# Patient Record
Sex: FEMALE | Race: WHITE | Employment: FULL TIME | URBAN - METROPOLITAN AREA
[De-identification: names, ages, dates, MRNs, and addresses within clinical notes are randomized per-mention and may not be internally consistent; named-entity substitution may affect disease eponyms.]

---

## 2019-11-18 LAB
C. TRACHOMATIS, EXTERNAL RESULT: NEGATIVE
N. GONORRHOEAE, EXTERNAL RESULT: NEGATIVE

## 2019-12-11 LAB
ABO, EXTERNAL RESULT: NORMAL
HEP B, EXTERNAL RESULT: NEGATIVE
HIV, EXTERNAL RESULT: NONREACTIVE
RH FACTOR, EXTERNAL RESULT: NEGATIVE
RPR, EXTERNAL RESULT: NONREACTIVE
RUBELLA TITER, EXTERNAL RESULT: NORMAL

## 2020-04-24 ENCOUNTER — HOSPITAL ENCOUNTER (OUTPATIENT)
Dept: NURSING | Age: 30
Setting detail: INFUSION SERIES
Discharge: HOME | End: 2020-04-24
Payer: COMMERCIAL

## 2020-04-24 VITALS
HEIGHT: 62 IN | BODY MASS INDEX: 29.44 KG/M2 | SYSTOLIC BLOOD PRESSURE: 111 MMHG | HEART RATE: 95 BPM | RESPIRATION RATE: 20 BRPM | TEMPERATURE: 97.6 F | DIASTOLIC BLOOD PRESSURE: 69 MMHG | WEIGHT: 160 LBS

## 2020-04-24 LAB — RHIG LOT NUMBER: NORMAL

## 2020-04-24 PROCEDURE — 6360000002 HC RX W HCPCS: Performed by: OBSTETRICS & GYNECOLOGY

## 2020-04-24 PROCEDURE — 96372 THER/PROPH/DIAG INJ SC/IM: CPT

## 2020-04-24 RX ADMIN — HUMAN RHO(D) IMMUNE GLOBULIN 300 MCG: 300 INJECTION, SOLUTION INTRAMUSCULAR at 09:15

## 2020-04-24 ASSESSMENT — PAIN - FUNCTIONAL ASSESSMENT: PAIN_FUNCTIONAL_ASSESSMENT: 0-10

## 2020-04-24 NOTE — PROGRESS NOTES
Rhogam given. Tolerated without immediate complications. States she does not need any more info on medication. Discharged ambulatory.

## 2020-06-15 LAB — GBS, EXTERNAL RESULT: NEGATIVE

## 2020-06-30 ENCOUNTER — OFFICE VISIT (OUTPATIENT)
Dept: PRIMARY CARE CLINIC | Age: 30
End: 2020-06-30
Payer: COMMERCIAL

## 2020-06-30 PROCEDURE — 99211 OFF/OP EST MAY X REQ PHY/QHP: CPT | Performed by: NURSE PRACTITIONER

## 2020-06-30 NOTE — PROGRESS NOTES
Patient is having a delivery with Stephane Vail on 7/10/20 and was seen at clinic for prenatal covid test. . Patient instructed to self quarantine until she goes into labor or is instructed to go to hospital.

## 2020-07-02 LAB
SARS-COV-2: NOT DETECTED
SOURCE: NORMAL

## 2020-07-07 ENCOUNTER — OFFICE VISIT (OUTPATIENT)
Dept: PRIMARY CARE CLINIC | Age: 30
End: 2020-07-07
Payer: COMMERCIAL

## 2020-07-07 PROCEDURE — 99211 OFF/OP EST MAY X REQ PHY/QHP: CPT | Performed by: NURSE PRACTITIONER

## 2020-07-07 NOTE — PROGRESS NOTES
Thu Bone received a viral test for COVID-19. They were educated on isolation and quarantine as appropriate. For any symptoms, they were directed to seek care from their PCP, given contact information to establish with a doctor, directed to an urgent care or the emergency room.

## 2020-07-08 ENCOUNTER — ANESTHESIA (OUTPATIENT)
Dept: LABOR AND DELIVERY | Age: 30
End: 2020-07-08
Payer: COMMERCIAL

## 2020-07-08 ENCOUNTER — HOSPITAL ENCOUNTER (INPATIENT)
Age: 30
LOS: 2 days | Discharge: HOME | End: 2020-07-10
Attending: OBSTETRICS & GYNECOLOGY | Admitting: OBSTETRICS & GYNECOLOGY
Payer: COMMERCIAL

## 2020-07-08 ENCOUNTER — ANESTHESIA EVENT (OUTPATIENT)
Dept: LABOR AND DELIVERY | Age: 30
End: 2020-07-08
Payer: COMMERCIAL

## 2020-07-08 ENCOUNTER — APPOINTMENT (OUTPATIENT)
Dept: LABOR AND DELIVERY | Age: 30
End: 2020-07-08
Payer: COMMERCIAL

## 2020-07-08 PROBLEM — Z3A.39 39 WEEKS GESTATION OF PREGNANCY: Status: ACTIVE | Noted: 2020-07-08

## 2020-07-08 LAB
AMPHETAMINE SCREEN, URINE: NORMAL
BARBITURATE SCREEN URINE: NORMAL
BASOPHILS ABSOLUTE: 0 K/UL (ref 0–0.2)
BASOPHILS RELATIVE PERCENT: 0.5 %
BENZODIAZEPINE SCREEN, URINE: NORMAL
BUPRENORPHINE URINE: NORMAL
CANNABINOID SCREEN URINE: NORMAL
COCAINE METABOLITE SCREEN URINE: NORMAL
EOSINOPHILS ABSOLUTE: 0.1 K/UL (ref 0–0.6)
EOSINOPHILS RELATIVE PERCENT: 1.5 %
HCT VFR BLD CALC: 36.7 % (ref 36–48)
HEMOGLOBIN: 12.5 G/DL (ref 12–16)
LYMPHOCYTES ABSOLUTE: 1.6 K/UL (ref 1–5.1)
LYMPHOCYTES RELATIVE PERCENT: 17.1 %
Lab: NORMAL
MCH RBC QN AUTO: 29.4 PG (ref 26–34)
MCHC RBC AUTO-ENTMCNC: 34 G/DL (ref 31–36)
MCV RBC AUTO: 86.6 FL (ref 80–100)
METHADONE SCREEN, URINE: NORMAL
MONOCYTES ABSOLUTE: 0.6 K/UL (ref 0–1.3)
MONOCYTES RELATIVE PERCENT: 6.9 %
NEUTROPHILS ABSOLUTE: 6.9 K/UL (ref 1.7–7.7)
NEUTROPHILS RELATIVE PERCENT: 74 %
OPIATE SCREEN URINE: NORMAL
OXYCODONE URINE: NORMAL
PDW BLD-RTO: 15.2 % (ref 12.4–15.4)
PH UA: 7
PHENCYCLIDINE SCREEN URINE: NORMAL
PLATELET # BLD: 220 K/UL (ref 135–450)
PMV BLD AUTO: 9.1 FL (ref 5–10.5)
PROPOXYPHENE SCREEN: NORMAL
RBC # BLD: 4.24 M/UL (ref 4–5.2)
SARS-COV-2, PCR: NOT DETECTED
SPECIMEN STATUS: NORMAL
TOTAL SYPHILLIS IGG/IGM: NORMAL
WBC # BLD: 9.3 K/UL (ref 4–11)

## 2020-07-08 PROCEDURE — 85025 COMPLETE CBC W/AUTO DIFF WBC: CPT

## 2020-07-08 PROCEDURE — 51701 INSERT BLADDER CATHETER: CPT

## 2020-07-08 PROCEDURE — 10907ZC DRAINAGE OF AMNIOTIC FLUID, THERAPEUTIC FROM PRODUCTS OF CONCEPTION, VIA NATURAL OR ARTIFICIAL OPENING: ICD-10-PCS | Performed by: OBSTETRICS & GYNECOLOGY

## 2020-07-08 PROCEDURE — 2500000003 HC RX 250 WO HCPCS: Performed by: NURSE ANESTHETIST, CERTIFIED REGISTERED

## 2020-07-08 PROCEDURE — 3700000025 EPIDURAL BLOCK: Performed by: ANESTHESIOLOGY

## 2020-07-08 PROCEDURE — 2580000003 HC RX 258: Performed by: OBSTETRICS & GYNECOLOGY

## 2020-07-08 PROCEDURE — 6360000002 HC RX W HCPCS: Performed by: OBSTETRICS & GYNECOLOGY

## 2020-07-08 PROCEDURE — 7200000001 HC VAGINAL DELIVERY

## 2020-07-08 PROCEDURE — 80307 DRUG TEST PRSMV CHEM ANLYZR: CPT

## 2020-07-08 PROCEDURE — 1220000000 HC SEMI PRIVATE OB R&B

## 2020-07-08 PROCEDURE — 6370000000 HC RX 637 (ALT 250 FOR IP): Performed by: OBSTETRICS & GYNECOLOGY

## 2020-07-08 PROCEDURE — 3E033VJ INTRODUCTION OF OTHER HORMONE INTO PERIPHERAL VEIN, PERCUTANEOUS APPROACH: ICD-10-PCS | Performed by: OBSTETRICS & GYNECOLOGY

## 2020-07-08 PROCEDURE — 86780 TREPONEMA PALLIDUM: CPT

## 2020-07-08 PROCEDURE — 0KQM0ZZ REPAIR PERINEUM MUSCLE, OPEN APPROACH: ICD-10-PCS | Performed by: OBSTETRICS & GYNECOLOGY

## 2020-07-08 RX ORDER — DIPHENHYDRAMINE HYDROCHLORIDE 50 MG/ML
25 INJECTION INTRAMUSCULAR; INTRAVENOUS EVERY 4 HOURS PRN
Status: DISCONTINUED | OUTPATIENT
Start: 2020-07-08 | End: 2020-07-08

## 2020-07-08 RX ORDER — ONDANSETRON 2 MG/ML
4 INJECTION INTRAMUSCULAR; INTRAVENOUS EVERY 6 HOURS PRN
Status: DISCONTINUED | OUTPATIENT
Start: 2020-07-08 | End: 2020-07-10 | Stop reason: HOSPADM

## 2020-07-08 RX ORDER — DIPHENHYDRAMINE HYDROCHLORIDE 50 MG/ML
12.5 INJECTION INTRAMUSCULAR; INTRAVENOUS EVERY 6 HOURS PRN
Status: DISCONTINUED | OUTPATIENT
Start: 2020-07-08 | End: 2020-07-10 | Stop reason: HOSPADM

## 2020-07-08 RX ORDER — LIDOCAINE HYDROCHLORIDE 10 MG/ML
30 INJECTION, SOLUTION EPIDURAL; INFILTRATION; INTRACAUDAL; PERINEURAL PRN
Status: DISCONTINUED | OUTPATIENT
Start: 2020-07-08 | End: 2020-07-08

## 2020-07-08 RX ORDER — FERROUS SULFATE 325(65) MG
325 TABLET ORAL 2 TIMES DAILY WITH MEALS
Status: DISCONTINUED | OUTPATIENT
Start: 2020-07-09 | End: 2020-07-10 | Stop reason: HOSPADM

## 2020-07-08 RX ORDER — LANOLIN 100 %
OINTMENT (GRAM) TOPICAL PRN
Status: DISCONTINUED | OUTPATIENT
Start: 2020-07-08 | End: 2020-07-10 | Stop reason: HOSPADM

## 2020-07-08 RX ORDER — ACETAMINOPHEN 325 MG/1
650 TABLET ORAL EVERY 4 HOURS PRN
Status: DISCONTINUED | OUTPATIENT
Start: 2020-07-08 | End: 2020-07-08

## 2020-07-08 RX ORDER — SODIUM CHLORIDE 0.9 % (FLUSH) 0.9 %
10 SYRINGE (ML) INJECTION PRN
Status: DISCONTINUED | OUTPATIENT
Start: 2020-07-08 | End: 2020-07-10 | Stop reason: HOSPADM

## 2020-07-08 RX ORDER — SODIUM CHLORIDE 0.9 % (FLUSH) 0.9 %
10 SYRINGE (ML) INJECTION PRN
Status: DISCONTINUED | OUTPATIENT
Start: 2020-07-08 | End: 2020-07-08

## 2020-07-08 RX ORDER — SODIUM CHLORIDE 0.9 % (FLUSH) 0.9 %
10 SYRINGE (ML) INJECTION EVERY 12 HOURS SCHEDULED
Status: DISCONTINUED | OUTPATIENT
Start: 2020-07-08 | End: 2020-07-08

## 2020-07-08 RX ORDER — ONDANSETRON 2 MG/ML
4 INJECTION INTRAMUSCULAR; INTRAVENOUS EVERY 6 HOURS PRN
Status: DISCONTINUED | OUTPATIENT
Start: 2020-07-08 | End: 2020-07-08

## 2020-07-08 RX ORDER — SODIUM CHLORIDE, SODIUM LACTATE, POTASSIUM CHLORIDE, CALCIUM CHLORIDE 600; 310; 30; 20 MG/100ML; MG/100ML; MG/100ML; MG/100ML
INJECTION, SOLUTION INTRAVENOUS CONTINUOUS
Status: DISCONTINUED | OUTPATIENT
Start: 2020-07-08 | End: 2020-07-10 | Stop reason: HOSPADM

## 2020-07-08 RX ORDER — SIMETHICONE 80 MG
80 TABLET,CHEWABLE ORAL EVERY 6 HOURS PRN
Status: DISCONTINUED | OUTPATIENT
Start: 2020-07-08 | End: 2020-07-10 | Stop reason: HOSPADM

## 2020-07-08 RX ORDER — BUPIVACAINE HYDROCHLORIDE 5 MG/ML
INJECTION, SOLUTION EPIDURAL; INTRACAUDAL PRN
Status: DISCONTINUED | OUTPATIENT
Start: 2020-07-08 | End: 2020-07-08 | Stop reason: SDUPTHER

## 2020-07-08 RX ORDER — IBUPROFEN 800 MG/1
800 TABLET ORAL EVERY 6 HOURS PRN
Status: DISCONTINUED | OUTPATIENT
Start: 2020-07-09 | End: 2020-07-10 | Stop reason: HOSPADM

## 2020-07-08 RX ORDER — KETOROLAC TROMETHAMINE 30 MG/ML
30 INJECTION, SOLUTION INTRAMUSCULAR; INTRAVENOUS EVERY 6 HOURS
Status: DISPENSED | OUTPATIENT
Start: 2020-07-08 | End: 2020-07-09

## 2020-07-08 RX ORDER — ACETAMINOPHEN 325 MG/1
650 TABLET ORAL EVERY 4 HOURS PRN
Status: DISCONTINUED | OUTPATIENT
Start: 2020-07-08 | End: 2020-07-10 | Stop reason: HOSPADM

## 2020-07-08 RX ORDER — DOCUSATE SODIUM 100 MG/1
100 CAPSULE, LIQUID FILLED ORAL 2 TIMES DAILY PRN
Status: DISCONTINUED | OUTPATIENT
Start: 2020-07-08 | End: 2020-07-10 | Stop reason: HOSPADM

## 2020-07-08 RX ORDER — BUPIVACAINE HYDROCHLORIDE 2.5 MG/ML
INJECTION, SOLUTION EPIDURAL; INFILTRATION; INTRACAUDAL PRN
Status: DISCONTINUED | OUTPATIENT
Start: 2020-07-08 | End: 2020-07-08 | Stop reason: SDUPTHER

## 2020-07-08 RX ORDER — SODIUM CHLORIDE, SODIUM LACTATE, POTASSIUM CHLORIDE, CALCIUM CHLORIDE 600; 310; 30; 20 MG/100ML; MG/100ML; MG/100ML; MG/100ML
INJECTION, SOLUTION INTRAVENOUS CONTINUOUS
Status: DISCONTINUED | OUTPATIENT
Start: 2020-07-08 | End: 2020-07-08

## 2020-07-08 RX ORDER — SODIUM CHLORIDE 0.9 % (FLUSH) 0.9 %
10 SYRINGE (ML) INJECTION EVERY 12 HOURS SCHEDULED
Status: DISCONTINUED | OUTPATIENT
Start: 2020-07-08 | End: 2020-07-10 | Stop reason: HOSPADM

## 2020-07-08 RX ORDER — BUTORPHANOL TARTRATE 1 MG/ML
1 INJECTION, SOLUTION INTRAMUSCULAR; INTRAVENOUS
Status: DISCONTINUED | OUTPATIENT
Start: 2020-07-08 | End: 2020-07-08

## 2020-07-08 RX ADMIN — BENZOCAINE AND LEVOMENTHOL: 200; 5 SPRAY TOPICAL at 23:50

## 2020-07-08 RX ADMIN — ACETAMINOPHEN 650 MG: 325 TABLET ORAL at 23:49

## 2020-07-08 RX ADMIN — BUPIVACAINE HYDROCHLORIDE 5 ML: 2.5 INJECTION, SOLUTION EPIDURAL; INFILTRATION; INTRACAUDAL; PERINEURAL at 16:26

## 2020-07-08 RX ADMIN — BUPIVACAINE HYDROCHLORIDE 10 ML: 5 INJECTION, SOLUTION EPIDURAL; INTRACAUDAL; PERINEURAL at 21:30

## 2020-07-08 RX ADMIN — DOCUSATE SODIUM 100 MG: 100 CAPSULE, LIQUID FILLED ORAL at 23:49

## 2020-07-08 RX ADMIN — KETOROLAC TROMETHAMINE 30 MG: 30 INJECTION, SOLUTION INTRAMUSCULAR at 23:49

## 2020-07-08 RX ADMIN — Medication 1 MILLI-UNITS/MIN: at 08:53

## 2020-07-08 RX ADMIN — WITCH HAZEL 40 EACH: 500 SOLUTION RECTAL; TOPICAL at 23:50

## 2020-07-08 RX ADMIN — Medication 10 ML: at 23:50

## 2020-07-08 RX ADMIN — Medication 15 ML/HR: at 16:32

## 2020-07-08 RX ADMIN — SODIUM CHLORIDE, POTASSIUM CHLORIDE, SODIUM LACTATE AND CALCIUM CHLORIDE: 600; 310; 30; 20 INJECTION, SOLUTION INTRAVENOUS at 15:59

## 2020-07-08 RX ADMIN — SODIUM CHLORIDE, POTASSIUM CHLORIDE, SODIUM LACTATE AND CALCIUM CHLORIDE: 600; 310; 30; 20 INJECTION, SOLUTION INTRAVENOUS at 08:52

## 2020-07-08 RX ADMIN — SODIUM CHLORIDE, POTASSIUM CHLORIDE, SODIUM LACTATE AND CALCIUM CHLORIDE: 600; 310; 30; 20 INJECTION, SOLUTION INTRAVENOUS at 12:50

## 2020-07-08 ASSESSMENT — PAIN SCALES - GENERAL: PAINLEVEL_OUTOF10: 5

## 2020-07-08 NOTE — ANESTHESIA PROCEDURE NOTES
Epidural Block    Patient location during procedure: OB  Start time: 7/8/2020 4:14 PM  End time: 7/8/2020 4:32 PM  Reason for block: labor epidural  Staffing  Resident/CRNA: GONZALES Moreno CRNA  Performed: resident/CRNA   Preanesthetic Checklist  Completed: patient identified, site marked, surgical consent, pre-op evaluation, timeout performed, IV checked, risks and benefits discussed, monitors and equipment checked, anesthesia consent given, oxygen available and patient being monitored  Epidural  Patient position: sitting  Prep: Betadine  Patient monitoring: continuous pulse ox and frequent blood pressure checks  Approach: midline  Location: lumbar (1-5) (L4-5)  Injection technique: DAVE saline  Provider prep: mask and sterile gloves  Needle  Needle type: Tuohy   Needle gauge: 17 G  Needle length: 3.5 in  Catheter type: side hole  Catheter size: 19 G  Catheter at skin depth: 10 cm  Test dose: negative  Assessment  Sensory level: T6  Hemodynamics: stable  Attempts: 1  Additional Notes  Sitting, Sterile prep/drape, 1%Xylo at L4-5, 17ga Tuohy with DAVE, 25ga Pencan for w/+CSF for DPE, Pencan removed, Catheter inserted, negative test dose, sterile dressing applied.

## 2020-07-08 NOTE — FLOWSHEET NOTE
Dr Easton Carmichael updated on [address] [facility] with occasional late decel resolved with position change and IVF bolus.

## 2020-07-08 NOTE — H&P
Department of Obstetrics and Gynecology   Obstetrics History and Physical        CHIEF COMPLAINT:  IOL    HISTORY OF PRESENT ILLNESS:      The patient is a 27 y.o. female at 38w11d. OB History        3    Para   1    Term   0       0    AB   0    Living   1       SAB   0    TAB   0    Ectopic   0    Molar        Multiple   0    Live Births   1            Patient presents with a chief complaint as above and is being admitted for induction    Estimated Due Date: Estimated Date of Delivery: 7/10/20    PRENATAL CARE:    Complicated by: none    PAST OB HISTORY:  OB History        3    Para   1    Term   0       0    AB   0    Living   1       SAB   0    TAB   0    Ectopic   0    Molar        Multiple   0    Live Births   1                Past Medical History:        Diagnosis Date    Abnormal Pap smear of cervix     Asthma     as a child, no inhaler    HPV (human papilloma virus) anogenital infection      Past Surgical History:        Procedure Laterality Date    WISDOM TOOTH EXTRACTION       Allergies:  Chantix [varenicline tartrate]    Social History:    Social History     Socioeconomic History    Marital status:      Spouse name: Not on file    Number of children: Not on file    Years of education: Not on file    Highest education level: Not on file   Occupational History    Not on file   Social Needs    Financial resource strain: Not on file    Food insecurity     Worry: Not on file     Inability: Not on file    Transportation needs     Medical: Not on file     Non-medical: Not on file   Tobacco Use    Smoking status: Former Smoker     Packs/day: 0.50    Smokeless tobacco: Never Used   Substance and Sexual Activity    Alcohol use:  Yes     Alcohol/week: 8.0 standard drinks     Types: 8 Cans of beer per week    Drug use: No    Sexual activity: Yes     Partners: Male   Lifestyle    Physical activity     Days per week: Not on file     Minutes per session: Not on file  Stress: Not on file   Relationships    Social connections     Talks on phone: Not on file     Gets together: Not on file     Attends Mandaen service: Not on file     Active member of club or organization: Not on file     Attends meetings of clubs or organizations: Not on file     Relationship status: Not on file    Intimate partner violence     Fear of current or ex partner: Not on file     Emotionally abused: Not on file     Physically abused: Not on file     Forced sexual activity: Not on file   Other Topics Concern    Not on file   Social History Narrative    Not on file     Family History:   History reviewed. No pertinent family history. Medications Prior to Admission:  Medications Prior to Admission: ferrous sulfate 325 (65 FE) MG tablet, Take 1 tablet by mouth daily (with breakfast)  docusate sodium (COLACE, DULCOLAX) 100 MG CAPS, Take 100 mg by mouth 2 times daily  [DISCONTINUED] ibuprofen (ADVIL;MOTRIN) 800 MG tablet, Take 1 tablet by mouth every 6 hours as needed  [DISCONTINUED] acetaminophen-codeine (TYLENOL #3) 300-30 MG per tablet, Take 1-2 tablets by mouth every 6 hours as needed for Pain  Prenatal Vit-Fe Fumarate-FA (PRENATAL VITAMIN PO), Take 1 tablet by mouth    REVIEW OF SYSTEMS:    wnl    PHYSICAL EXAM:  Vitals:    07/08/20 0814 07/08/20 0852   BP: 130/72 128/70   Pulse: 99 90   Resp: 16 16   Temp: 97.6 °F (36.4 °C)    TempSrc: Oral    Weight: 169 lb (76.7 kg)    Height: 5' 2\" (1.575 m)      General appearance:  awake, alert, cooperative, no apparent distress, and appears stated age  Neurologic:  Awake, alert, oriented to name, place and time. Lungs:  No increased work of breathing, good air exchange  Abdomen:  Soft, non tender, gravid, consistent with her gestational age, EFW by Leopold's maneuver was 8#  Fetal heart rate:  Reassuring.   Pelvis:  Adequate pelvis  Cervix: 2/50%/-2  Contraction frequency:  none    Membranes:  Intact    Labs:   CBC with Differential:    Lab Results Component Value Date    WBC 9.3 07/08/2020    RBC 4.24 07/08/2020    HGB 12.5 07/08/2020    HCT 36.7 07/08/2020     07/08/2020    MCV 86.6 07/08/2020    MCH 29.4 07/08/2020    MCHC 34.0 07/08/2020    RDW 15.2 07/08/2020    LYMPHOPCT 17.1 07/08/2020    MONOPCT 6.9 07/08/2020    BASOPCT 0.5 07/08/2020    MONOSABS 0.6 07/08/2020    LYMPHSABS 1.6 07/08/2020    EOSABS 0.1 07/08/2020    BASOSABS 0.0 07/08/2020       ASSESSMENT AND PLAN:    Admit/IOL-Pitocin, anticipate normal delivery, routine labor orders  Fetus: Reassuring  GBS: No    RAMIRO Womack

## 2020-07-08 NOTE — ANESTHESIA PRE PROCEDURE
Department of Anesthesiology  Preprocedure Note       Name:  Sabine Danielle   Age:  27 y.o.  :  1990                                          MRN:  [id number]         Date:  2020      Surgeon: * No surgeons listed *    Procedure: * No procedures listed *    Medications prior to admission:   Prior to Admission medications    Medication Sig Start Date End Date Taking?  Authorizing Provider   ferrous sulfate 325 (65 FE) MG tablet Take 1 tablet by mouth daily (with breakfast) 16   Snow Beasley MD   docusate sodium (COLACE, DULCOLAX) 100 MG CAPS Take 100 mg by mouth 2 times daily 16   Snow Beasley MD   Prenatal Vit-Fe Fumarate-FA (PRENATAL VITAMIN PO) Take 1 tablet by mouth    Historical Provider, MD       Current medications:    Current Facility-Administered Medications   Medication Dose Route Frequency Provider Last Rate Last Dose    lactated ringers infusion   Intravenous Continuous Cheng Laureano  mL/hr at 20 1559      sodium chloride flush 0.9 % injection 10 mL  10 mL Intravenous 2 times per day Cheng Laureano MD        sodium chloride flush 0.9 % injection 10 mL  10 mL Intravenous PRN Cheng Laureano MD        lidocaine PF 1 % injection 30 mL  30 mL Other PRN Cheng Laureano MD        acetaminophen (TYLENOL) tablet 650 mg  650 mg Oral Q4H PRN Cheng Laureano MD        diphenhydrAMINE (BENADRYL) injection 25 mg  25 mg Intravenous Q4H PRN Cheng Laureano MD        ondansetron [facility]) injection 4 mg  4 mg Intravenous Q6H PRN Cheng Laureano MD        oxytocin (PITOCIN) 30 units in 500 mL infusion  1 bang-units/min Intravenous Continuous PRN Cheng Laureano MD        oxytocin (PITOCIN) 30 units in 500 mL infusion  1 bang-units/min Intravenous Continuous Cheng Laureano MD 10 mL/hr at 20 1530 10 bang-units/min at 20 1530    penicillin G potassium 5 Million Units in dextrose 5 % 100 mL IVPB (mini-bag)  5 Million Units Intravenous Once Shanna Bennett MD        Followed by   Lorna Mendez penicillin G potassium in d5w IVPB 2.5 Million Units  2.5 Million Units Intravenous Q4H Shanna Bennett MD        butorphanol (STADOL) injection 1 mg  1 mg Intravenous Q3H PRN Shanna Bennett MD         Facility-Administered Medications Ordered in Other Encounters   Medication Dose Route Frequency Provider Last Rate Last Dose    bupivacaine (PF) (MARCAINE) 0.25 % injection    PRN Waldoell Delfabian, APRN - CRNA   5 mL at 07/08/20 1626    sodium chloride 0.9 % 200 mL with fentaNYL 500 mcg, bupivacaine 0.5% 50 mL (OB) epidural    Continuous PRN Runell Delude, APRN - CRNA 15 mL/hr at 07/08/20 1632 15 mL/hr at 07/08/20 1632       Allergies: Allergies   Allergen Reactions    Chantix [Varenicline Tartrate] Rash       Problem List:    Patient Active Problem List   Diagnosis Code    38 weeks gestation of pregnancy Z3A.38    Delayed delivery after SROM (spontaneous rupture of membranes) O42.90    39 weeks gestation of pregnancy Z3A.39       Past Medical History:        Diagnosis Date    Abnormal Pap smear of cervix     Asthma     as a child, no inhaler    Chlamydia 2010    HPV (human papilloma virus) anogenital infection        Past Surgical History:        Procedure Laterality Date    COLPOSCOPY  2013    WISDOM TOOTH EXTRACTION         Social History:    Social History     Tobacco Use    Smoking status: Former Smoker     Packs/day: 0.50    Smokeless tobacco: Never Used   Substance Use Topics    Alcohol use:  Yes     Alcohol/week: 8.0 standard drinks     Types: 8 Cans of beer per week                                Counseling given: Not Answered      Vital Signs (Current):   Vitals:    07/08/20 1359 07/08/20 1456 07/08/20 1559 07/08/20 1623   BP: (!) 111/58 123/76 134/78 131/82   Pulse: 71 77 101 93   Resp: 16 16 18 16   Temp:  36.6 °C (97.9 °F)     TempSrc:  Oral     Weight:       Height: BP Readings from Last 3 Encounters:   07/08/20 131/82   04/24/20 111/69   07/04/16 132/80       NPO Status: Time of last liquid consumption: 0700                        Time of last solid consumption: 0700                        Date of last liquid consumption: 07/08/20                        Date of last solid food consumption: 07/08/20    BMI:   Wt Readings from Last 3 Encounters:   07/08/20 169 lb (76.7 kg)   04/24/20 160 lb (72.6 kg)   06/30/16 172 lb (78 kg)     Body mass index is 30.91 kg/m². CBC:   Lab Results   Component Value Date    WBC 9.3 07/08/2020    RBC 4.24 07/08/2020    HGB 12.5 07/08/2020    HCT 36.7 07/08/2020    MCV 86.6 07/08/2020    RDW 15.2 07/08/2020     07/08/2020       CMP:   Lab Results   Component Value Date     10/26/2015    K 3.3 10/26/2015    CL 99 10/26/2015    CO2 25 10/26/2015    BUN 7 10/26/2015    CREATININE <0.5 10/26/2015    GFRAA >60 10/26/2015    AGRATIO 1.6 10/26/2015    LABGLOM >60 10/26/2015    GLUCOSE 106 10/26/2015    PROT 7.6 10/26/2015    CALCIUM 9.2 10/26/2015    BILITOT 0.3 10/26/2015    ALKPHOS 52 10/26/2015    AST 22 10/26/2015    ALT 26 10/26/2015       POC Tests: No results for input(s): POCGLU, POCNA, POCK, POCCL, POCBUN, POCHEMO, POCHCT in the last 72 hours.     Coags: No results found for: PROTIME, INR, APTT    HCG (If Applicable):   Lab Results   Component Value Date    PREGTESTUR Negative 10/26/2015        ABGs: No results found for: PHART, PO2ART, WOV0JHK, VQO2YGQ, BEART, R8KWLVPF     Type & Screen (If Applicable):  No results found for: LABABO, LABRH    Drug/Infectious Status (If Applicable):  No results found for: HIV, HEPCAB    COVID-19 Screening (If Applicable):   Lab Results   Component Value Date    COVID19 Not Detected 06/30/2020         Anesthesia Evaluation   no history of anesthetic complications:   Airway: Mallampati: II  TM distance: >3 FB   Neck ROM: full  Mouth opening: > = 3 FB Dental: normal exam Pulmonary:normal exam    (+) asthma:                            Cardiovascular:Negative CV ROS                      Neuro/Psych:   Negative Neuro/Psych ROS              GI/Hepatic/Renal: Neg GI/Hepatic/Renal ROS            Endo/Other: Negative Endo/Other ROS                    Abdominal:           Vascular: negative vascular ROS. Anesthesia Plan      epidural     ASA 2 - emergent             Anesthetic plan and risks discussed with patient and spouse. Plan discussed with attending.                   GONZALES Fernandez - CRNA   7/8/2020

## 2020-07-09 LAB
HCT VFR BLD CALC: 35 % (ref 36–48)
HEMOGLOBIN: 11.6 G/DL (ref 12–16)
MCH RBC QN AUTO: 29.1 PG (ref 26–34)
MCHC RBC AUTO-ENTMCNC: 33.1 G/DL (ref 31–36)
MCV RBC AUTO: 87.8 FL (ref 80–100)
PDW BLD-RTO: 15.2 % (ref 12.4–15.4)
PLATELET # BLD: 202 K/UL (ref 135–450)
PMV BLD AUTO: 9.1 FL (ref 5–10.5)
RBC # BLD: 3.98 M/UL (ref 4–5.2)
WBC # BLD: 17.2 K/UL (ref 4–11)

## 2020-07-09 PROCEDURE — 85027 COMPLETE CBC AUTOMATED: CPT

## 2020-07-09 PROCEDURE — 36415 COLL VENOUS BLD VENIPUNCTURE: CPT

## 2020-07-09 PROCEDURE — 1220000000 HC SEMI PRIVATE OB R&B

## 2020-07-09 PROCEDURE — 2580000003 HC RX 258: Performed by: OBSTETRICS & GYNECOLOGY

## 2020-07-09 PROCEDURE — 6370000000 HC RX 637 (ALT 250 FOR IP): Performed by: OBSTETRICS & GYNECOLOGY

## 2020-07-09 RX ADMIN — BENZOCAINE AND LEVOMENTHOL: 200; 5 SPRAY TOPICAL at 16:10

## 2020-07-09 RX ADMIN — IBUPROFEN 800 MG: 800 TABLET, FILM COATED ORAL at 13:18

## 2020-07-09 RX ADMIN — ACETAMINOPHEN 650 MG: 325 TABLET ORAL at 20:27

## 2020-07-09 RX ADMIN — DOCUSATE SODIUM 100 MG: 100 CAPSULE, LIQUID FILLED ORAL at 08:54

## 2020-07-09 RX ADMIN — ACETAMINOPHEN 650 MG: 325 TABLET ORAL at 08:53

## 2020-07-09 RX ADMIN — DOCUSATE SODIUM 100 MG: 100 CAPSULE, LIQUID FILLED ORAL at 20:27

## 2020-07-09 RX ADMIN — ACETAMINOPHEN 650 MG: 325 TABLET ORAL at 16:10

## 2020-07-09 RX ADMIN — HYDROCORTISONE: 25 CREAM TOPICAL at 16:10

## 2020-07-09 RX ADMIN — WITCH HAZEL 40 EACH: 500 SOLUTION RECTAL; TOPICAL at 16:10

## 2020-07-09 RX ADMIN — IBUPROFEN 800 MG: 800 TABLET, FILM COATED ORAL at 19:00

## 2020-07-09 RX ADMIN — IBUPROFEN 800 MG: 800 TABLET, FILM COATED ORAL at 07:06

## 2020-07-09 RX ADMIN — ACETAMINOPHEN 650 MG: 325 TABLET ORAL at 04:18

## 2020-07-09 RX ADMIN — Medication 10 ML: at 08:55

## 2020-07-09 ASSESSMENT — PAIN SCALES - GENERAL
PAINLEVEL_OUTOF10: 3
PAINLEVEL_OUTOF10: 2
PAINLEVEL_OUTOF10: 3
PAINLEVEL_OUTOF10: 3
PAINLEVEL_OUTOF10: 2
PAINLEVEL_OUTOF10: 4
PAINLEVEL_OUTOF10: 2

## 2020-07-09 NOTE — LACTATION NOTE
Lactation Progress Note      Data:   Multip and experienced breast feeder who delivered last pm. Mob states that baby has been feeding well. Action: Breast feeding education provided. Encouraged to allow baby to go to breast ad tarun. Stressed importance of a good deep latch and offered latch assessment prn. Discouraged paci and bottles for the first few weeks. Encouraged good hydration and nutrition. [address] [facility] number on board for f/u. Response: Verbalized understanding and comfortable with breast feeding at this time.

## 2020-07-09 NOTE — PROGRESS NOTES
Department of Obstetrics and Gynecology  Labor and Delivery  Attending Post Partum Progress Note      SUBJECTIVE:  Pt without complaints, pain controlled, tolerating po, lochia wnl, currently breast feeding. OBJECTIVE:      Vitals:  Vitals:    20 0402   BP: 110/69   Pulse: 80   Resp: 16   Temp: 98.1 °F (36.7 °C)   SpO2:        RRR CTAB  ABDOMEN:  soft, non-distended, non-tender, + BS  FF below umbilicus  EXT: no edema    DATA:    CBC:    Lab Results   Component Value Date    WBC 17.2 2020    HGB 11.6 2020    HCT 35.0 2020     2020       ASSESSMENT & PLAN:    27 y.o.   OB History        3    Para   2    Term   1       0    AB   0    Living   2       SAB   0    TAB   0    Ectopic   0    Molar        Multiple   0    Live Births   2             s/p  ppd# 1  1. Doing well, continue routine post-partum care.

## 2020-07-09 NOTE — PLAN OF CARE
Problem: Anxiety:  Goal: Level of anxiety will decrease  Description: Level of anxiety will decrease  Outcome: Ongoing     Problem: Breathing Pattern - Ineffective:  Goal: Able to breathe comfortably  Description: Able to breathe comfortably  Outcome: Ongoing     Problem: Fluid Volume - Imbalance:  Goal: Absence of imbalanced fluid volume signs and symptoms  Description: Absence of imbalanced fluid volume signs and symptoms  Outcome: Ongoing  Goal: Absence of intrapartum hemorrhage signs and symptoms  Description: Absence of intrapartum hemorrhage signs and symptoms  Outcome: Ongoing     Problem: Infection - Intrapartum Infection:  Goal: Will show no infection signs and symptoms  Description: Will show no infection signs and symptoms  Outcome: Ongoing     Problem: Labor Process - Prolonged:  Goal: Labor progression, first stage, within specified pattern  Description: Labor progression, first stage, within specified pattern  Outcome: Ongoing  Goal: Labor progession, second stage, within specified pattern  Description: Labor progession, second stage, within specified pattern  Outcome: Ongoing  Goal: Uterine contractions within specified parameters  Description: Uterine contractions within specified parameters  Outcome: Ongoing     Problem: Pain - Acute:  Goal: Pain level will decrease  Description: Pain level will decrease  Outcome: Ongoing  Goal: Able to cope with pain  Description: Able to cope with pain  Outcome: Ongoing     Problem: Tissue Perfusion - Uteroplacental, Altered:  Goal: Absence of abnormal fetal heart rate pattern  Description: Absence of abnormal fetal heart rate pattern  Outcome: Ongoing     Problem: Urinary Retention:  Goal: Experiences of bladder distention will decrease  Description: Experiences of bladder distention will decrease  Outcome: Ongoing  Goal: Urinary elimination within specified parameters  Description: Urinary elimination within specified parameters  Outcome: Ongoing     Problem: Pain:  Goal: Pain level will decrease  Description: Pain level will decrease  Outcome: Ongoing  Goal: Control of acute pain  Description: Control of acute pain  Outcome: Ongoing  Goal: Control of chronic pain  Description: Control of chronic pain  Outcome: Ongoing

## 2020-07-09 NOTE — L&D DELIVERY SUMMARY NOTE
[address] [address]                            LABOR AND DELIVERY NOTE    PATIENT NAME: Onalee Boxer                :        1990  MED REC NO:   [id number]                          ROOM:       9093  ACCOUNT NO:   [de-identified]                           ADMIT DATE: 2020  PROVIDER:     Boris Cooks, MD    DATE OF PROCEDURE:  2020    A 27year-old  3, para 1, presented at 39 weeks 5 days for an  induction of labor with a favorable cervix. The patient's prenatal  course was uncomplicated. Group B beta strep culture was negative. Fetal heart tracing was reassuring upon presentation. Pitocin was used  for induction of labor. Amniotomy was performed for clear fluid. The patient did request and  received an epidural during labor. She did progress to second stage and  after one hour of second stage delivered by spontaneous vaginal delivery  of a viable female infant with Apgars 9 at one minute and 9 at five  minutes. When the patient began the second stage, the baby was in the  occiput-transverse position and with gentle manual rotation, baby was  turned to occiput anterior as she began the second stage. No episiotomy was performed with delivery, but a secondary perineal  laceration did occur. Placenta delivered spontaneously grossly intact. There were no vaginal or cervical lacerations. Perineal laceration was  repaired in a normal fashion with 3-0 Vicryl. Urine output was 50 mL  after straight cath. Rectovaginal exam noted an intact rectal mucosa  and rectal sphincter. All sponges and needles were accounted for after  delivery. Blood loss was 200 mL. Fetal weight was pending at the time  of this dictation. The patient named her little girl, Delta Marie.         Martha Parker MD    D: 2020 22:13:28       T: 2020 22:19:41     [id number]  Job#: [id number] Doc#: [id number]    CC:

## 2020-07-09 NOTE — PLAN OF CARE
Problem: Fluid Volume - Imbalance:  Goal: Absence of postpartum hemorrhage signs and symptoms  Description: Absence of postpartum hemorrhage signs and symptoms  Outcome: Ongoing

## 2020-07-09 NOTE — BRIEF OP NOTE
Brief Postoperative Note      Patient: Ciara Galvez  YOB: 1990  MRN: [id number]    Date of Procedure:     IOL  , VFI  Apgars 9/9  2nd degree perineal laceration  Placenta spon.   cc  Janneth Che\"    Electronically signed by Shiela Brock MD on 2020 at 10:10 PM

## 2020-07-09 NOTE — PROGRESS NOTES
Patient's desires up to BR. Patient assisted to sitting on side of bed and allowed to \"dangle. \"  Patient denies dizziness and lightheadedness. Patient assisted up to x 2 assistants and ambulated without difficulty to BR. Patient voided 450ml blood tinged urine. Pericare taught and demonstration returned. Patient informed on postpartum pericare and instructions given of use of tucks pads and dermaplast spray. Linens and gown changed. Patient transported via wheelchair to room 318 and assisted back to bed and call light within reach. Patient instructed to call for assistance with next void. Patient verbalized understanding.

## 2020-07-10 VITALS
WEIGHT: 169 LBS | BODY MASS INDEX: 31.1 KG/M2 | HEIGHT: 62 IN | DIASTOLIC BLOOD PRESSURE: 71 MMHG | SYSTOLIC BLOOD PRESSURE: 122 MMHG | TEMPERATURE: 98.4 F | HEART RATE: 76 BPM | RESPIRATION RATE: 16 BRPM | OXYGEN SATURATION: 98 %

## 2020-07-10 PROCEDURE — 6370000000 HC RX 637 (ALT 250 FOR IP): Performed by: OBSTETRICS & GYNECOLOGY

## 2020-07-10 PROCEDURE — 2580000003 HC RX 258: Performed by: OBSTETRICS & GYNECOLOGY

## 2020-07-10 RX ADMIN — IBUPROFEN 800 MG: 800 TABLET, FILM COATED ORAL at 01:22

## 2020-07-10 RX ADMIN — DOCUSATE SODIUM 100 MG: 100 CAPSULE, LIQUID FILLED ORAL at 09:23

## 2020-07-10 RX ADMIN — IBUPROFEN 800 MG: 800 TABLET, FILM COATED ORAL at 09:22

## 2020-07-10 RX ADMIN — Medication 10 ML: at 09:22

## 2020-07-10 ASSESSMENT — PAIN SCALES - GENERAL
PAINLEVEL_OUTOF10: 2
PAINLEVEL_OUTOF10: 2

## 2020-07-10 NOTE — PROGRESS NOTES
Discharge Phone Call Log  Patient Name: Radha Sims     [facility] Care Provider: Vitaly Camilo MD Discharge Date: 7/10/2020    Disposition of baby:    Phone Number: [phone] (home)     Attempts to Contact:  Date:    Nurse  Date:    Nurse  Date:    Nurse    1. Now that you are at home is your pain being well controlled? Y/N   What pain reducing measures are you using? ____________________________________        Information for the patient's :  [place] [[id number]]   Delivery Method: Vaginal, Spontaneous    2. Are you currently  having any infant feeding issues? Y/N _____________________________ If yes, please explain: __________________________________________________________________  3. If breastfeeding, were you satisfied with the breastfeeding support services offered? Y/N  4.  Have you had to supplement? Y/N If yes, please explain: _____________________________________________________  5. Did your OB provider offer you information about the benefits of breastfeeding during your prenatal visits? Y/N  6.  Have you made or have you already had your first appointment with the baby's doctor? Y/N If no, do you know when to schedule it? Y/N   7.  Have you scheduled your follow-up appointment? Y/N  If no, do you know when to schedule it? Y/N  8. Did staff discuss safe sleep during your stay? Y/N  Did you see the wall cling posted in your room explaining how to keep you and your baby safe? Y/N  9. Can you tell me at least 1 point you learned from reading or hearing about infant safety and safe sleep practices? 10. Did your nurses and physicians include you in the plan of care, communicating with you respectfully? Y/N If no, please explain __________________________  11. Is there anyone in particular you would like to mention who provided care for you? ________________________________  12. Did your discharge occur in a timely manner?   Y/N If no, please explain __________________________  13. Do you have any other questions or concerns I can address today?  Y/N  __________________________________________________      Teaching During interview :_____________________________________________  ___________________________RN       Date:______________Time:________________

## 2020-07-10 NOTE — PROGRESS NOTES
[facility] and Delivery   Post Partum Progress Note      SUBJECTIVE:  PPD#2 s/p  female  Doing well   breastfeeding    OBJECTIVE:      Vitals:  Vitals:    07/10/20 0930   BP: 122/71   Pulse: 76   Resp: 16   Temp: 98.4 °F (36.9 °C)   SpO2:         Fundus firm, normal lochia  Extremities normal      DATA:    CBC:    Lab Results   Component Value Date    WBC 17.2 2020    RBC 3.98 2020    HGB 11.6 2020    HCT 35.0 2020    MCV 87.8 2020    RDW 15.2 2020     2020       ASSESSMENT & PLAN:       PPD#2 s/p  female  Dc home today , reviewed pp instructions, declined motrin script, reviewed motrin and continue Cole Perea MD

## 2020-07-10 NOTE — DISCHARGE SUMMARY
Obstetrical Discharge Form    Gestational Age:39w5d    Antepartum complications: none  Date of Delivery: 2020    Type of Delivery: vaginal, spontaneous    Delivered By:  Dr. John Rutherford:   Information for the patient's :  Kelly Bah [[id number]]        Anesthesia: epidural    Intrapartum complications: None    Postpartum complications: none    Discharge Medication:    Alcus Pare   Home Medication Instructions [id number]    Printed on:07/10/20 6997   Medication Information                      docusate sodium (COLACE, DULCOLAX) 100 MG CAPS  Take 100 mg by mouth 2 times daily             Prenatal Vit-Fe Fumarate-FA (PRENATAL VITAMIN PO)  Take 1 tablet by mouth                  Discharge Date: 7/10/2020    Condition on discharge: Stable    Plan:   Follow up in 6 week(s)  Reviewed discharge instructions and scripts     Jose Arellano MD

## 2020-07-10 NOTE — LACTATION NOTE
Lactation Progress Note      Data:   F/U on multip breast feeder who hopes to be d/c home today. Mob states that baby has been feeding very well. Baby is currently at breast with good position and latch. Output and wt loss are WNL. Action: Discharge teaching done; what to expect in the first few days of life, to feed baby at first sign of hunger cue for total of 8-12 times per day after the first DOL, how to properly position and latch baby, how to know baby is getting enough, engorgement prevention and treatment, avoiding bottles and pacifiers, community resources and pumping. Encouraged to call [facility] for f/u prn. Response: Comfortable with breast feeding for d/c. Infliximab Pregnancy And Lactation Text: This medication is Pregnancy Category B and is considered safe during pregnancy. It is unknown if this medication is excreted in breast milk.

## 2020-07-10 NOTE — PLAN OF CARE
Problem: Constipation:  Goal: Bowel elimination is within specified parameters  Description: Bowel elimination is within specified parameters  7/10/2020 0134 by Darryl Dale RN  Outcome: Met This Shift  7/9/2020 1635 by Winter Barton RN  Outcome: Ongoing     Problem: Fluid Volume - Imbalance:  Goal: Absence of imbalanced fluid volume signs and symptoms  Description: Absence of imbalanced fluid volume signs and symptoms  7/10/2020 0134 by Darryl Dale RN  Outcome: Met This Shift  7/9/2020 1635 by Winter Barton RN  Outcome: Ongoing  Goal: Absence of postpartum hemorrhage signs and symptoms  Description: Absence of postpartum hemorrhage signs and symptoms  7/10/2020 0134 by Darryl Dale RN  Outcome: Met This Shift  7/9/2020 1635 by Winter Barton RN  Outcome: Ongoing     Problem: Infection - Risk of, Puerperal Infection:  Goal: Will show no infection signs and symptoms  Description: Will show no infection signs and symptoms  7/10/2020 0134 by Darryl Dale RN  Outcome: Met This Shift  7/9/2020 1635 by Winter Barton RN  Outcome: Ongoing     Problem: Mood - Altered:  Goal: Mood stable  Description: Mood stable  7/10/2020 0134 by Darryl Dale RN  Outcome: Met This Shift  7/9/2020 1635 by Winter Barton RN  Outcome: Ongoing     Problem: Pain - Acute:  Goal: Pain level will decrease  Description: Pain level will decrease  7/10/2020 0134 by Darryl Dale RN  Outcome: Met This Shift  7/9/2020 1635 by Winter Barton RN  Outcome: Ongoing

## 2020-07-10 NOTE — PLAN OF CARE
Problem: Constipation:  Goal: Bowel elimination is within specified parameters  Description: Bowel elimination is within specified parameters  7/10/2020 0134 by Geovanna Hoyt RN  Outcome: Met This Shift  7/9/2020 1635 by Nuvia Guillen RN  Outcome: Ongoing     Problem: Fluid Volume - Imbalance:  Goal: Absence of imbalanced fluid volume signs and symptoms  Description: Absence of imbalanced fluid volume signs and symptoms  7/10/2020 0134 by Geovanna Hoyt RN  Outcome: Met This Shift  7/9/2020 1635 by Nuvia Guillen RN  Outcome: Ongoing  Goal: Absence of postpartum hemorrhage signs and symptoms  Description: Absence of postpartum hemorrhage signs and symptoms  7/10/2020 0134 by Geovanna Hoyt RN  Outcome: Met This Shift  7/9/2020 1635 by Nuvia Guillen RN  Outcome: Ongoing     Problem: Infection - Risk of, Puerperal Infection:  Goal: Will show no infection signs and symptoms  Description: Will show no infection signs and symptoms  7/10/2020 0134 by Geovanna Hoyt RN  Outcome: Met This Shift  7/9/2020 1635 by Nuvia Guillen RN  Outcome: Ongoing     Problem: Mood - Altered:  Goal: Mood stable  Description: Mood stable  7/10/2020 0134 by Geovanna Hoyt RN  Outcome: Met This Shift  7/9/2020 1635 by Nuvia Guillen RN  Outcome: Ongoing     Problem: Pain - Acute:  Goal: Pain level will decrease  Description: Pain level will decrease  7/10/2020 0134 by Geovanna Hoyt RN  Outcome: Met This Shift  7/9/2020 1635 by Nuvia Guillen RN  Outcome: Ongoing